# Patient Record
Sex: FEMALE | Race: AMERICAN INDIAN OR ALASKA NATIVE
[De-identification: names, ages, dates, MRNs, and addresses within clinical notes are randomized per-mention and may not be internally consistent; named-entity substitution may affect disease eponyms.]

---

## 2020-01-06 ENCOUNTER — HOSPITAL ENCOUNTER (OUTPATIENT)
Dept: HOSPITAL 5 - MAMMO | Age: 50
Discharge: HOME | End: 2020-01-06
Attending: INTERNAL MEDICINE
Payer: COMMERCIAL

## 2020-01-06 DIAGNOSIS — N63.21: ICD-10-CM

## 2020-01-06 DIAGNOSIS — Z12.31: Primary | ICD-10-CM

## 2020-01-06 PROCEDURE — 77067 SCR MAMMO BI INCL CAD: CPT

## 2020-01-06 NOTE — MAMMOGRAPHY REPORT
DIGITAL SCREENING MAMMOGRAM WITH CAD, 1/6/2020



INDICATION: Routine screening mammography. 



TECHNIQUE:  Digital bilateral  2D mammography was obtained in the craniocaudal and mediolateral obliq
ue projections. This examination was interpreted with the benefit of Computer-Aided Detection analysi
s.



COMPARISON: 7/1/2015



FINDINGS: 



Breast Density: The breasts are heterogeneously dense, which may obscure small masses.



Left upper outer circumscribed masses are new and require additional imaging. The largest measures 3 
cm on the MLO view. No architectural distortion or suspicious calcifications of the left breast. Ther
e is no evidence of dominant mass, suspicious calcifications or architectural distortion in the right
 breast.



IMPRESSION: Left masses requiring additional imaging. Recommend recall for left spot compression view
s and targeted left breast ultrasound.



Follow up recommendation: Special View: Spot



Category 0: Incomplete. Needs additional imaging evaluation and/or prior mammograms for comparison.



A "normal" or negative report should not discourage follow up or biopsy of a clinically significant f
inding.



A written summary of these findings will be mailed to the patient. The patient will be entered into a
 mammography reporting system which will generate a reminder letter for the patient's next appointmen
t at the appropriate interval.



The [employer] recommends yearly mammograms starting at age 40 and continuing as l
dianne as a woman is in good health.  Breast MRI is recommended for women with an approximate 20-25% or 
greater lifetime risk of breast cancer, including women with a strong family history of breast or ova
abhishek cancer or who have been treated for Hodgkin's disease.



Signer Name: Mario Olson MD 

Signed: 1/6/2020 2:43 PM

 Workstation Name: [id number]

## 2020-01-22 ENCOUNTER — HOSPITAL ENCOUNTER (OUTPATIENT)
Dept: HOSPITAL 5 - MAMMO | Age: 50
Discharge: HOME | End: 2020-01-22
Attending: INTERNAL MEDICINE
Payer: COMMERCIAL

## 2020-01-22 DIAGNOSIS — N60.02: Primary | ICD-10-CM

## 2020-01-22 NOTE — MAMMOGRAPHY REPORT
LEFT DIGITAL DIAGNOSTIC MAMMOGRAM WITH CAD 1/22/2020

LEFT COMPLETE BREAST ULTRASOUND

 

INDICATION: Recalled for evaluation of circumscribed masses. ABN MAMMO



TECHNIQUE:  Digital left mammographic imaging was performed. Spot compression views were obtained. Co
mplete ultrasound of all four (4) quadrants was performed. This examination was interpreted with the 
benefit of Computer-Aided Detection (CAD) analysis. 



COMPARISON: 1/6/2020



FINDINGS: 



Breast Density: The breast is heterogeneously dense, which may obscure small masses.



MAMMOGRAPHIC FINDINGS: Circumscribed masses persist in the outer breast on spot compression views.



ULTRASOUND FINDINGS: Complete sonographic evaluation of all 4 quadrants and retroareolar region was p
erformed.   Ultrasound of the left breast demonstrated multiple benign cysts. The largest is at 2:00 
7 cm from the nipple measuring 2.3 x 2.7 x 1.3 cm. It correlates with the largest mammographic mass. 
A benign cyst at 2:00 9 cm from the nipple measures 0.7 x 0.6 x 1.1 cm. A benign cyst at 1:00 6 cm fr
om the nipple measures 1.6 x 1.0 x 1.0 cm. No solid mass or shadowing.



IMPRESSION: Benign cysts left breast.



Follow up recommendation: Routine yearly



BI-RADS Category 2:  Benign.





A "normal" or negative report should not discourage follow up or biopsy of a clinically significant f
inding.



A written summary of these findings will be mailed to the patient. The patient will be entered into a
 mammography reporting system which will generate a reminder letter for the patient's next appointmen
t at the appropriate interval.



According to the American [employer] of [employer], yearly mammograms are recommended starting at age 40 
and continuing as long as a woman is in good health.  Breast MRI is recommended for women with an emely
roximately 20-25% or greater lifetime risk of breast cancer, including women with a strong family his
tory of breast or ovarian cancer and women who have been treated for Hodgkin's disease.



Signer Name: Mario Olson MD 

Signed: 1/22/2020 2:33 PM

 Workstation Name: [id number]

## 2020-01-22 NOTE — ULTRASOUND REPORT
LEFT DIGITAL DIAGNOSTIC MAMMOGRAM WITH CAD 1/22/2020

LEFT COMPLETE BREAST ULTRASOUND

 

INDICATION: Recalled for evaluation of circumscribed masses. ABN MAMMO



TECHNIQUE:  Digital left mammographic imaging was performed. Spot compression views were obtained. Co
mplete ultrasound of all four (4) quadrants was performed. This examination was interpreted with the 
benefit of Computer-Aided Detection (CAD) analysis. 



COMPARISON: 1/6/2020



FINDINGS: 



Breast Density: The breast is heterogeneously dense, which may obscure small masses.



MAMMOGRAPHIC FINDINGS: Circumscribed masses persist in the outer breast on spot compression views.



ULTRASOUND FINDINGS: Complete sonographic evaluation of all 4 quadrants and retroareolar region was p
erformed.   Ultrasound of the left breast demonstrated multiple benign cysts. The largest is at 2:00 
7 cm from the nipple measuring 2.3 x 2.7 x 1.3 cm. It correlates with the largest mammographic mass. 
A benign cyst at 2:00 9 cm from the nipple measures 0.7 x 0.6 x 1.1 cm. A benign cyst at 1:00 6 cm fr
om the nipple measures 1.6 x 1.0 x 1.0 cm. No solid mass or shadowing.



IMPRESSION: Benign cysts left breast.



Follow up recommendation: Routine yearly



BI-RADS Category 2:  Benign.





A "normal" or negative report should not discourage follow up or biopsy of a clinically significant f
inding.



A written summary of these findings will be mailed to the patient. The patient will be entered into a
 mammography reporting system which will generate a reminder letter for the patient's next appointmen
t at the appropriate interval.



According to the American [employer], yearly mammograms are recommended starting at age 40 
and continuing as long as a woman is in good health.  Breast MRI is recommended for women with an emely
roximately 20-25% or greater lifetime risk of breast cancer, including women with a strong family his
tory of breast or ovarian cancer and women who have been treated for Hodgkin's disease.



Signer Name: Mario Olson MD 

Signed: 1/22/2020 2:33 PM

 Workstation Name: [id number]

## 2021-02-02 ENCOUNTER — HOSPITAL ENCOUNTER (OUTPATIENT)
Dept: HOSPITAL 5 - CT | Age: 51
Discharge: HOME | End: 2021-02-02
Attending: OBSTETRICS & GYNECOLOGY
Payer: COMMERCIAL

## 2021-02-02 DIAGNOSIS — R19.07: ICD-10-CM

## 2021-02-02 DIAGNOSIS — K57.30: ICD-10-CM

## 2021-02-02 DIAGNOSIS — D25.9: Primary | ICD-10-CM

## 2021-02-02 LAB — BUN SERPL-MCNC: 20 MG/DL (ref 7–17)

## 2021-02-02 PROCEDURE — 36415 COLL VENOUS BLD VENIPUNCTURE: CPT

## 2021-02-02 PROCEDURE — 84520 ASSAY OF UREA NITROGEN: CPT

## 2021-02-02 PROCEDURE — 72193 CT PELVIS W/DYE: CPT

## 2021-02-02 PROCEDURE — 82565 ASSAY OF CREATININE: CPT

## 2021-02-02 NOTE — CAT SCAN REPORT
CT PELVIS WITH CONTRAST



HISTORY: Pelvic mass



TECHNIQUE: Helical CT was performed following 100 cc of Omnipaque 300 intravenously. Sagittal and cor
onal reformatted images. All CT scans at this location are performed using CT dose reduction for ALATALYA
A by means of automated exposure control.



COMPARISON: None



FINDINGS: The uterus is enlarged and lobulated with numerous fibroids. The uterus measures 12.9 x 9.7
 x 10.0 cm. The largest fibroid in the posterior wall measures 7.7 cm. There are approximately 12 add
itional fibroids measuring between 1 cm and 4 cm in diameter. Most of the fibroids mildly enhance. No
 calcific degeneration is appreciated. The endometrial stripe is not clearly identified secondary to 
the fibroid disease but does not appear to be thickened. The adnexa and bladder are unremarkable.



There is moderate diverticulosis of the visualized colon. Normal appendix. The bladder is empty but u
nremarkable.



No evidence for adenopathy, free fluid, free air or inflammatory changes. The vascular structures and
 bony structures are unremarkable.



IMPRESSION:

Moderate to severe uterine fibroid disease as described above.



Signer Name: Keny Evans Jr, MD 

Signed: 2/2/2021 10:09 AM

Workstation Name: [id number]

## 2021-02-23 ENCOUNTER — HOSPITAL ENCOUNTER (OUTPATIENT)
Dept: HOSPITAL 5 - SPVWC | Age: 51
Discharge: HOME | End: 2021-02-23
Attending: SURGERY
Payer: COMMERCIAL

## 2021-02-23 DIAGNOSIS — Z12.31: Primary | ICD-10-CM

## 2021-02-23 PROCEDURE — 77067 SCR MAMMO BI INCL CAD: CPT

## 2021-02-23 NOTE — MAMMOGRAPHY REPORT
DIGITAL SCREENING MAMMOGRAM WITH CAD, 2/23/2021



CLINICAL INFORMATION / INDICATION: Routine screening mammography. SCREENING MAMMO



TECHNIQUE:  Digital bilateral 2D mammography was obtained in the craniocaudal and mediolateral obliqu
e projections. This examination was interpreted with the benefit of Computer-Aided Detection analysis
.



COMPARISON: 1/6/2020



FINDINGS: 



Breast Density: The breasts are heterogeneously dense, which may obscure small masses.



No dominant mass, suspicious calcifications, or architectural distortion in either breast. 



Extensive nodularity most notably in the left breast again noted with a scar also in the left breast.
 Findings appear largely similar to the previous exam.

 

IMPRESSION: No mammographic evidence of malignancy.



Follow up recommendation: Routine yearly



BI-RADS Category 2:  Benign.





-------------------------------------------------------------------------------------------

A "normal" or negative report should not discourage follow up or biopsy of a clinically significant f
inding.



A written summary of these findings will be mailed to the patient. The patient will be entered into a
 mammography reporting system which will generate a reminder letter for the patient's next appointmen
t at the appropriate interval.



The [employer] recommends yearly mammograms starting at age 40 and continuing as l
dianne as a woman is in good health.  Breast MRI is recommended for women with an approximate 20-25% or 
greater lifetime risk of breast cancer, including women with a strong family history of breast or ova
abhishek cancer or who have been treated for Hodgkin's disease.



Signer Name: Moises Ruelas MD 

Signed: 2/23/2021 10:43 AM

Workstation Name: [id number]

## 2021-03-11 ENCOUNTER — HOSPITAL ENCOUNTER (OUTPATIENT)
Dept: HOSPITAL 5 - US | Age: 51
Discharge: HOME | End: 2021-03-11
Attending: SURGERY
Payer: COMMERCIAL

## 2021-03-11 DIAGNOSIS — N63.20: ICD-10-CM

## 2021-03-11 DIAGNOSIS — N60.02: Primary | ICD-10-CM

## 2021-03-11 PROCEDURE — 76942 ECHO GUIDE FOR BIOPSY: CPT

## 2021-03-11 NOTE — ULTRASOUND REPORT
Ultrasound-guided left breast cyst aspiration



HISTORY: LUMP IN LEFT BREAST.



COMPARISON:  Mammogram from 2/23/2021 and previous ultrasound from 1/22/2020



PROCEDURE:  The risks (including but not limited to bleeding and infection) and benefits were explain
ed to the patient and informed consent was obtained.  A time out procedure was performed.  The proced
ure site was prepped and draped in the usual sterile fashion and lidocaine was used for local anesthe
humberto.  



Under direct ultrasound guidance, a 3.2 cm cyst along the outer left breast was targeted for aspirati
on. A 16-gauge needle was advanced into the cyst and the cyst was completely collapsed. I aspirated a
 total of 8 mL of thin slightly cloudy yellow tinted fluid. Fluid was placed in a sterile cup and sen
t to the lab for cytology evaluation.



The patient tolerated the procedure well with no complications.



IMPRESSION: Successful complete aspiration of the 3.2 cm left breast cyst.



 



Signer Name: Moises Ruelas MD 

Signed: 3/11/2021 10:08 AM

Workstation Name: [id number]

## 2021-04-01 LAB
BAND NEUTROPHILS # (MANUAL): 0 K/MM3
BUN SERPL-MCNC: 9 MG/DL (ref 7–17)
BUN/CREAT SERPL: 10 %
CALCIUM SERPL-MCNC: 8.9 MG/DL (ref 8.4–10.2)
HCT VFR BLD CALC: 33.9 % (ref 30.3–42.9)
HEMOLYSIS INDEX: 4
HGB BLD-MCNC: 11.3 GM/DL (ref 10.1–14.3)
MCHC RBC AUTO-ENTMCNC: 33 % (ref 30–34)
MCV RBC AUTO: 88 FL (ref 79–97)
MYELOCYTES # (MANUAL): 0 K/MM3
PLATELET # BLD: 429 K/MM3 (ref 140–440)
PROMYELOCYTES # (MANUAL): 0 K/MM3
RBC # BLD AUTO: 3.87 M/MM3 (ref 3.65–5.03)
TOTAL CELLS COUNTED BLD: 100

## 2021-04-01 NOTE — HISTORY AND PHYSICAL REPORT
History of Present Illness


Date of examination: 21


Chief complaint: 





This is a 50 year-old female with menorrhagia, pelvic pain and uterine fibroids 

who presents for definitive therapy in the form of hysterectomy and any other 

indicated procedures. 


History of present illness: 


Past Pregnancy History 


   :      5


   Term Births:   4


   Living Children:   4


   Aborta:      1


   Elect. Ab:      1





Pregnancy # 1


   Delivery date:     1989


   Weeks Gestation:   40


   Delivery type:     


   Infant Sex:      Male


   Birth weight:   7'12


   Comments:      FD





Pregnancy # 2


   Delivery date:     1991


   Weeks Gestation:   40


   Delivery type:     


   Infant Sex:      Male


   Birth weight:   7'15


   Comments:      glucose issues





Pregnancy # 3


   Delivery date:     12/15/1993


   Weeks Gestation:   42


   Delivery type:     


   Infant Sex:      Male


   Birth weight:   6'14


   Comments:      glucose issues





Pregnancy # 4


   Delivery date:     1998


   Weeks Gestation:   40


   Delivery type:     


   Infant Sex:      Female


   Birth weight:   8'15


   Comments:      glucose issues








GYN History 


Operations: 


umbilical hernia repair


cyst removed from the mons. not sure why. incision was left open ()


colonoscopy . for IBS and diverticulosis() diverticulitis


Tubal Ligation ()





Abnormal PAP: positive





Infection History 


HIV Risk Eval: no


Personal hx. of genital herpes: yes


Hx of STD: HSV





Active Medications (reviewed today):


VITAMIN D3 125 MCG (5000 UT) ORAL CAPSULE (CHOLECALCIFEROL) 


METRONIDAZOLE 500 MG ORAL TABLET (METRONIDAZOLE) 1 po bid


AMOXICILLIN 500 MG ORAL CAPSULE (AMOXICILLIN) 1 po q 8hrs


MIRALAX 17 GM ORAL PACKET (POLYETHYLENE GLYCOL 3350) 


PROTONIX 40 MG ORAL TABLET DELAYED RELEASE (PANTOPRAZOLE SODIUM) 


ACETAMINOPHEN-CODEINE TABLET (ACETAMINOPHEN-CODEINE TABS) 


AMLODIPINE BESYLATE TABLET (AMLODIPINE BESYLATE TABS) 





Current Allergies (reviewed today):


LATEX GLOVES (DISPOSABLE GLOVES) (Critical)








Past Medical History:


   Herniated lumbar disc receives epidurals twice year


   diverticulitis/divericulosis


   hx of hsv oral.


   IBS


   Diabetes diet controlled


   Hyperlipidemia


   Hypertension


   Vitamin D deficiency





Past Surgical History:


   Reviewed history from 2021 and no changes required:


      


      umbilical hernia repair


      cyst removed from the mons. not sure why. incision was left open ()


      colonoscopy . for IBS and diverticulosis() 

diverticulitis(3/28/2021)


      Tubal Ligation ()





Family History Summary: 


   Reviewed history Last on 2020 and no changes required:2021


Other Family Member - Has No Family History of Uterine Cancer - Entered On: 

2021


Other Family Member - Has No Family History of Small Bowel Cancer - Entered On: 

2021


Other Family Member - Has No Family History of Stomach Cancer - Entered On: 

2021


Other Family Member - Has No Family History of Pancreatic Cancer - Entered On: 

2021


Other Family Member - Has No Family History of Ovarvian Cancer - Entered On: 

2021


Other Family Member - Has No Family History of Kidney/Urinary Tract Cancer - 

Entered On: 2021


Other Family Member - Has No Family History of Spontaneous DVT-PE - Entered On: 

2021


Other Family Member - Has No Family History of Colon Cancer - Entered On: 

2021


Other Family Member - Has No Family History of Brain Cancer - Entered On: 

2021


Other Family Member - Has No Family History of Breast Cancer - Entered On: 

2021


Other Family Member - Has No Family History of Biliary Tract Cancer - Entered 

On: 2021





General Comments - FH:


mother Hodgkins 


Family History of Diabetes


mother and father, t2dm


Family History of Hyperlipidemia


Family History of Coronary Heart Disease


sister  age  51 mi








son has epilepsy. multiple seizures age 19. 





Social History:


   Reviewed history from 2020 and no changes required:


      Patient is 


      


      Smoking History:


      Patient has never smoked.








Risk Factors: 





Smoked Tobacco Use:  Never smoker


Smokeless Tobacco Use:  Never


Drug use:  no


HIV high-risk behavior:  no


Alcohol use:  yes


Exercise:  no


Seatbelt use:  100 %





Previous Tobacco Use: Signed On - 2021


Smoked Tobacco Use:  Never smoker


Smokeless Tobacco Use:  Never


Drug use:  no


HIV high-risk behavior:  no





Previous Alcohol Use: Signed On 2021


Alcohol use:  yes


   Type:  occ


   Drinks per day:  social


Exercise:  no


Seatbelt use:  100 %





Family History Risk Factors:


   Family History of MI in females < 65 years old:  yes





Colonoscopy History:


   Date of Last Colonoscopy:  2020





Mammogram History:


   Date of Last Mammogram:  2020





PAP Smear History:


   Date of Last PAP Smear:  2020














Physical Exam 


Appearance: well developed, well nourished, no acute distress





Other Exams 


Lungs: no rales, rhonchi, or wheezes


Heart: S1, S2, no murmur, rub, or gallop


Abdomen: soft, non-tender, no masses





Genitourinary Exam 


Uterus: deferred for EUA











Impression & Recommendations:





Problem # 1:  Menorrhagia (ICD-626.2) (AYA87-K04.0)


Diagnosis explained to patient . Questions answered. Discussed with patient 

various medical and surgical therapies common for treatment: Hormonal/medical 

therapy or hysterectomy.  She desire to proceed with hysterectomy


Diagnosis explained to patient . Discussed with patient various medical, 

surgical and radiological therapies common for treatment including, but not 

limited to, myomectomy,  hysterectomy and uterine artery embolization.  

Discussed risks and benefits of laparotomy, laparoscopy, vaginal and robotic 

assisted approaches for hysterectomies. Patient desires definitive treatment in 

the form of robot assisted laparoscopic total hysterectomy.    The risks and 

alternatives for this surgery were reviewed with the patient.  She was informed 

of  the risks of the surgery including, but not limited to, pain, infection, 

bleeding possibly heavy enough to require a blood transfusion with associated 

risks of infections (hepatitis and HIV) and transfusion reactions, possible 

damage to bowel, bladder or ureter(s) and surrounding organs. . Patient 

understands that this surgery will  make her sterile. Indications to abort a 

robotic/laparoscopic procedure and perform an open procedure were explained. She

desires ovarian conservation. She was informed she may require surgery later to 

have her ovaries removed for a benign or mailgnant condition. Patient 

understands if her ovaries are removed she will become menopausal. Patient 

advised the small risks of spreading of malignancy if morcellation  is required 

during the surgery patient understands and approves performing if necessary. 

Questions answered. Consent reviewed and signed


The patient was instructed/informed the following:


   The normal length of hospital stay for this procedure.


   Nothing to eat or drink after midnight the evening prior to surgery. 


   Clear liquids the day before surgery.  


   Pre-op instruction sheets given. 


Wound care instructions given.





Problem # 2:  Fibroids of  uterus; Intramural (ICD-218.1) (QTY34-V61.1)


Diagnosis explained to patient . Questions answered. Discussed with patient paige

ious medical, surgical and radioloigal therapies common for treatment: 

Hormonal/medical therapy, fibroid embolization, removal of fibroids or 

hysterectomy 


Her updated medication list for this problem includes:


   Metronidazole 500 Mg Oral Tablet (Metronidazole) ..... 1 po bid


   Ibgard 90 Mg Oral Capsule Extended Release (Peppermint oil)


   Amoxicillin 500 Mg Oral Capsule (Amoxicillin) ..... 1 po q 8hrs


   Acetaminophen-codeine Tablet (Acetaminophen-codeine tabs








Problem # 3:  Pelvic and perineal pain (ICD-789.00) (BJW96-L05.2)


It was extensively explained to her that her pain may persist, recur or change 

in nature due to the difficulty with diagnosis chronic pelvic pain or 

development of adhesions. She declined other treatment options at this time. 


Her updated medication list for this problem includes:


   Metronidazole 500 Mg Oral Tablet (Metronidazole) ..... 1 po bid


   Amoxicillin 500 Mg Oral Capsule (Amoxicillin) ..... 1 po q 8hrs


   Acetaminophen-codeine Tablet (Acetaminophen-codeine tabs)














Medications Added to Medication List This Visit:


1)  Vitamin D3 125 Mcg (5000 Ut) Oral Capsule (Cholecalciferol)


2)  Metronidazole 500 Mg Oral Tablet (Metronidazole) .... 1 po bid


3)  Vitamin D (cholecalciferol) 25 Mcg (1000 Ut) Oral Capsule (Cholecalciferol)


4)  Ibgard 90 Mg Oral Capsule Extended Release (Peppermint oil)


5)  Align Capsule (Probiotic product caps)


6)  Amoxicillin 500 Mg Oral Capsule (Amoxicillin) .... 1 po q 8hrs


7)  Pravastatin Sodium Tablet (Pravastatin sodium tabs)














]














Medications and Allergies


                                    Allergies











Allergy/AdvReac Type Severity Reaction Status Date / Time


 


latex Allergy  Rash, Verified 21 16:16





   irritation  











Active Meds: 


Active Medications





Acetaminophen (Acetaminophen 500 Mg Tab)  1,000 mg PO ONCE NR


   Stop: 21 20:00


Celecoxib (Celecoxib 200 Mg Cap)  400 mg PO PREOP NR


   Stop: 21 20:00


Fentanyl (Fentanyl 100 Mcg/2 Ml Inj)  100 mcg IV ONCE NR


   Stop: 21 20:00


Gabapentin (Gabapentin 300 Mg Cap)  600 mg PO PREOP NR


   Stop: 21 23:00


Lactated Ringer's (Lactated Ringers)  1,000 mls @ 125 mls/hr IV AS DIRECT CHAPITO


Cefazolin Sodium (Ancef/Sterile Water 2 Gm/20 Ml)  2 gm in 20 mls @ 80 mls/hr IV

PREOP NR; Protocol


   Stop: 21 20:00


Magnesium Oxide (Magnesium Oxide 400 Mg Tab)  400 mg PO ONCE NR


   Stop: 21 22:00


Midazolam HCl (Midazolam 2 Mg/2 Ml Inj)  2 mg IV PREOP NR


   Stop: 21 23:59











Assessment and Plan





- Patient Problems


(1) Menorrhagia


Status: Acute   





(2) Fibroids


Status: Acute   





(3) Pelvic pain


Status: Acute   





(4) Hypertension


Status: Chronic   





(5) Hyperlipidemia


Status: Chronic   





(6) Vitamin D deficiency


Status: Chronic   





(7) Diverticulosis


Status: Chronic   





(8) Diabetes


Status: Chronic   





(9) Lumbar herniated disc


Status: Chronic

## 2021-04-01 NOTE — ANESTHESIA CONSULTATION
Anesthesia Consult and Med Hx


Date of service: 04/06/21





- Airway


Anesthetic Teeth Evaluation: Caps


ROM Head & Neck: Adequate


Mental/Hyoid Distance: Adequate


Mallampati Class: Class II


Intubation Access Assessment: Good





- Pre-Operative Health Status


ASA Pre-Surgery Classification: ASA3


Proposed Anesthetic Plan: General


Nerve Block: TAP





- Pulmonary


Hx Smoking: Yes (Former)


Hx Respiratory Symptoms: No (+2FS)





- Cardiovascular System


Hx Hypertension: Yes (+Cardiac clearance)





- Central Nervous System


Hx Back Pain: Yes (HNP-gets epidurals)


Hx Psychiatric Problems: No





- Gastrointestinal


Hx Gastroesophageal Reflux Disease: Yes (IBS/diverticulosis; Was just 

hospitalized for diverticulitis)





- Endocrine


Hx Non-Insulin Dependent Diabetes: Yes (Diet-controlled)





- Hematic


Hx Sickle Cell Disease: No





- Other Systems


Hx Alcohol Use: Yes (Occas)


Hx Cancer: No


Hx Obesity: Yes





- Additional Comments


Anesthesia Medical History Comments: States she had awareness during BTL surgery

## 2021-04-06 ENCOUNTER — HOSPITAL ENCOUNTER (OUTPATIENT)
Dept: HOSPITAL 5 - OR | Age: 51
Setting detail: OBSERVATION
LOS: 1 days | Discharge: HOME | End: 2021-04-07
Attending: OBSTETRICS & GYNECOLOGY | Admitting: OBSTETRICS & GYNECOLOGY
Payer: COMMERCIAL

## 2021-04-06 DIAGNOSIS — E55.9: ICD-10-CM

## 2021-04-06 DIAGNOSIS — E11.9: ICD-10-CM

## 2021-04-06 DIAGNOSIS — E78.5: ICD-10-CM

## 2021-04-06 DIAGNOSIS — M51.26: ICD-10-CM

## 2021-04-06 DIAGNOSIS — Z98.51: ICD-10-CM

## 2021-04-06 DIAGNOSIS — N92.0: Primary | ICD-10-CM

## 2021-04-06 DIAGNOSIS — I10: ICD-10-CM

## 2021-04-06 DIAGNOSIS — D25.9: ICD-10-CM

## 2021-04-06 DIAGNOSIS — Z98.891: ICD-10-CM

## 2021-04-06 DIAGNOSIS — R10.9: ICD-10-CM

## 2021-04-06 DIAGNOSIS — K57.90: ICD-10-CM

## 2021-04-06 DIAGNOSIS — Z98.890: ICD-10-CM

## 2021-04-06 DIAGNOSIS — Z20.822: ICD-10-CM

## 2021-04-06 PROCEDURE — 36415 COLL VENOUS BLD VENIPUNCTURE: CPT

## 2021-04-06 PROCEDURE — 82962 GLUCOSE BLOOD TEST: CPT

## 2021-04-06 PROCEDURE — 88307 TISSUE EXAM BY PATHOLOGIST: CPT

## 2021-04-06 PROCEDURE — 64450 NJX AA&/STRD OTHER PN/BRANCH: CPT

## 2021-04-06 PROCEDURE — 86850 RBC ANTIBODY SCREEN: CPT

## 2021-04-06 PROCEDURE — 96366 THER/PROPH/DIAG IV INF ADDON: CPT

## 2021-04-06 PROCEDURE — 86900 BLOOD TYPING SEROLOGIC ABO: CPT

## 2021-04-06 PROCEDURE — 58573 TLH W/T/O UTERUS OVER 250 G: CPT

## 2021-04-06 PROCEDURE — 80048 BASIC METABOLIC PNL TOTAL CA: CPT

## 2021-04-06 PROCEDURE — 83036 HEMOGLOBIN GLYCOSYLATED A1C: CPT

## 2021-04-06 PROCEDURE — 85025 COMPLETE CBC W/AUTO DIFF WBC: CPT

## 2021-04-06 PROCEDURE — 96365 THER/PROPH/DIAG IV INF INIT: CPT

## 2021-04-06 PROCEDURE — 86901 BLOOD TYPING SEROLOGIC RH(D): CPT

## 2021-04-06 PROCEDURE — 96372 THER/PROPH/DIAG INJ SC/IM: CPT

## 2021-04-06 PROCEDURE — 85007 BL SMEAR W/DIFF WBC COUNT: CPT

## 2021-04-06 PROCEDURE — 81025 URINE PREGNANCY TEST: CPT

## 2021-04-06 PROCEDURE — 88302 TISSUE EXAM BY PATHOLOGIST: CPT

## 2021-04-06 PROCEDURE — 85014 HEMATOCRIT: CPT

## 2021-04-06 PROCEDURE — 96367 TX/PROPH/DG ADDL SEQ IV INF: CPT

## 2021-04-06 PROCEDURE — 96376 TX/PRO/DX INJ SAME DRUG ADON: CPT

## 2021-04-06 PROCEDURE — U0003 INFECTIOUS AGENT DETECTION BY NUCLEIC ACID (DNA OR RNA); SEVERE ACUTE RESPIRATORY SYNDROME CORONAVIRUS 2 (SARS-COV-2) (CORONAVIRUS DISEASE [COVID-19]), AMPLIFIED PROBE TECHNIQUE, MAKING USE OF HIGH THROUGHPUT TECHNOLOGIES AS DESCRIBED BY CMS-2020-01-R: HCPCS

## 2021-04-06 PROCEDURE — G0378 HOSPITAL OBSERVATION PER HR: HCPCS

## 2021-04-06 PROCEDURE — 96375 TX/PRO/DX INJ NEW DRUG ADDON: CPT

## 2021-04-06 PROCEDURE — A4217 STERILE WATER/SALINE, 500 ML: HCPCS

## 2021-04-06 PROCEDURE — 85018 HEMOGLOBIN: CPT

## 2021-04-06 RX ADMIN — HYDROMORPHONE HYDROCHLORIDE PRN MG: 1 INJECTION, SOLUTION INTRAMUSCULAR; INTRAVENOUS; SUBCUTANEOUS at 12:05

## 2021-04-06 RX ADMIN — HYDROMORPHONE HYDROCHLORIDE PRN MG: 1 INJECTION, SOLUTION INTRAMUSCULAR; INTRAVENOUS; SUBCUTANEOUS at 11:55

## 2021-04-06 RX ADMIN — MIDAZOLAM NR MG: 1 INJECTION INTRAMUSCULAR; INTRAVENOUS at 07:19

## 2021-04-06 RX ADMIN — KETOROLAC TROMETHAMINE SCH MG: 30 INJECTION, SOLUTION INTRAMUSCULAR at 15:53

## 2021-04-06 RX ADMIN — ACETAMINOPHEN SCH MG: 325 TABLET ORAL at 21:57

## 2021-04-06 RX ADMIN — FAMOTIDINE SCH MG: 10 INJECTION, SOLUTION INTRAVENOUS at 21:56

## 2021-04-06 RX ADMIN — KETOROLAC TROMETHAMINE SCH MG: 30 INJECTION, SOLUTION INTRAMUSCULAR at 21:56

## 2021-04-06 RX ADMIN — METRONIDAZOLE SCH MLS/HR: 5 INJECTION, SOLUTION INTRAVENOUS at 22:51

## 2021-04-06 RX ADMIN — HYDROMORPHONE HYDROCHLORIDE PRN MG: 1 INJECTION, SOLUTION INTRAMUSCULAR; INTRAVENOUS; SUBCUTANEOUS at 10:40

## 2021-04-06 RX ADMIN — MIDAZOLAM NR MG: 1 INJECTION INTRAMUSCULAR; INTRAVENOUS at 07:14

## 2021-04-06 RX ADMIN — SODIUM CHLORIDE, SODIUM LACTATE, POTASSIUM CHLORIDE, AND CALCIUM CHLORIDE SCH MLS/HR: .6; .31; .03; .02 INJECTION, SOLUTION INTRAVENOUS at 06:50

## 2021-04-06 RX ADMIN — CEFAZOLIN SCH MLS/HR: 330 INJECTION, POWDER, FOR SOLUTION INTRAMUSCULAR; INTRAVENOUS at 21:54

## 2021-04-06 RX ADMIN — INSULIN HUMAN SCH UNITS: 100 INJECTION, SOLUTION PARENTERAL at 17:30

## 2021-04-06 RX ADMIN — CEFAZOLIN SCH MLS/HR: 330 INJECTION, POWDER, FOR SOLUTION INTRAMUSCULAR; INTRAVENOUS at 15:00

## 2021-04-06 RX ADMIN — HYDROMORPHONE HYDROCHLORIDE PRN MG: 1 INJECTION, SOLUTION INTRAMUSCULAR; INTRAVENOUS; SUBCUTANEOUS at 10:52

## 2021-04-06 RX ADMIN — ACETAMINOPHEN SCH MG: 325 TABLET ORAL at 14:48

## 2021-04-06 RX ADMIN — INSULIN HUMAN SCH UNITS: 100 INJECTION, SOLUTION PARENTERAL at 22:07

## 2021-04-06 RX ADMIN — SODIUM CHLORIDE, SODIUM LACTATE, POTASSIUM CHLORIDE, AND CALCIUM CHLORIDE SCH MLS/HR: .6; .31; .03; .02 INJECTION, SOLUTION INTRAVENOUS at 13:20

## 2021-04-06 NOTE — POST ANESTHESIA EVALUATION
- Post Anesthesia Evaluation


Patient Participated: Yes


Airway Patent: Yes


Stable Respiratory Function: Yes


Nausea/Vomiting: No


Temp > 96.8F: Yes


Pain Manageable: Yes (Pt was having preoperative diverticul. pain)


Adequeate Hydration: Yes


Anesthesia Complications: No


Block Receding Appropriately: Yes


Patient on Ventilator: No


Other Comments: Loose tooth intact

## 2021-04-06 NOTE — OPERATIVE REPORT
Operative Report


Operative Report: 


Date:      4/6/2021





Preoperative diagnosis:   1.  Menorrhagia


         2.  Uterine fibroid


         3.  Body mass index of 38.7 kg/m


         4.  Pelvic pain


         


         


Postoperative diagnosis:   1.  Menorrhagia


         2.  Uterine fibroid


         3.  Body mass index of 38.7 kg/m


         4.  Pelvic pain





Procedure:      1.  Robotic-assisted laparoscopic total hysterectomy with 

bilateral 


         salpingectomy


         


Surgeon:      Germania Yi MD





Assistant:         Elizabeth Espinosa





Anesthesiologist:   Dr. Bhatt





Anesthesia:      General endotracheal anesthesia





EBL:       Approximately 100 mL 





Findings:       EUA: Uterus palpated to approximately 15 weeks.    Uterus was 

sounded to 12 cm.  Grossly normal tubes and ovaries.  Left ovary was adhered to 

the posterior aspect of the uterus.





Procedure:      Patient was taken to the OR and placed in the supine position.  

General anesthesia was induced and an oral gastric tube was placed.  Her neck 

and head were placed on foam support.  Foam eye protection with goggles were 

secured in place.  Then foam face protection was placed and secured.  Foam 

shoulder pads were then positioned on her shoulders for Trendelenburg positi

oning.  She was then placed in dorsolithotomy position.  Exam under anesthesia 

as above.  The abdomen and vagina were then prepped and draped in the usual 

sterile fashion.  Timeout was performed.  A Christianson catheter was inserted into the

bladder with drainage of clear yellow urine.  The operative speculum was 

introduced into the vagina and the anterior lip of the cervix was grasped with 

single-toothed tenaculum.  The uterus was sounded to 12 cm.  The cervix was 

progressively dilated to allow the large V care uterine manipulator.  The bulb 

of the manipulator was inflated and the speculum and tenaculum were removed.  

The cup of the manipulator was placed around the cervix and the blue occluder of

the manipulator was properly positioned in the vagina and secured.  A laparotomy

sponge that was saturated with a solution of polymyxin and saline was placed in 

the vagina to ensure pneumoperitoneum.  Sterile gloves were placed and attention

was turned to the abdomen.





A 10 mm midline vertical supraumbilical incision was made approximately 10 cm 

superior to the elevated fundus of the uterus.  A 10-12 mm trocar with the 

laparoscope and camera attached was introduced through this incision under 

direct visualization.  The abdomen was insufflated.  No obvious bowel, bladder, 

ureteral, or major vascular injury was noted.  The patient was then placed in 

steep Trendelenburg position and the following trochars were placed under direct

visualization: 8 mm robotic trochars were placed through incisions made in the 

bilateral midclavicular lower abdominal region approximately 10 cm lateral to 

the midline incision, and a 5 mm trocar was placed through an incision made in 

the right lower lateral pelvis.  The 10 mm laparoscope was then replaced by a 5 

mm laparoscope that was placed through the 5 millimeter lateral trocar.  The 12 

mm trocar was then removed and the Sohail Perez fascial closure device was 

placed through the incision and a 0 Vicryl was placed through the fascia.  Once 

the suture was secured the 12 mm trocar was reintroduced.  Once the trochars 

were in the appropriate positions,  the  da Abby robot system was engaged.  The

EndoShears and bipolar device was placed through the 8 mm trochars and 

positioned then attention was turned to the console.  The uterus was elevated 

and bilateral salpingectomy was performed.  Each tube was removed through the 5 

mm trocar and sent to pathology in separate containers.  The left ovary was 

gently released from the anterior fundal lateral aspect of the uterus.  Then the

utero-ovarian ligaments were clamped. cauterized and incised bilaterally using 

30 W of energy.  Then the round ligaments were clamped, cauterized and incised 

bilaterally.    The anterior leaf of the broad ligament was elevated and with 

careful blunt and sharp dissection the bladder flap was created and dissected 

away from the lower uterine segment and cervix.  The posterior leaf of the broad

ligament was dissected away from the uterine vessels.   The cup of the uterine 

manipulator was palpated both anteriorly and posteriorly.  The bladder was 

further dissected away from the lower uterine segment.  The uterine vessels were

then clamped and cauterized bilaterally.  Blanching of the uterus was then 

noted.  Attention was again turned to the anterior lower uterine segment and the

bladder was confirmed to be away from the operative field.  Then attention was 

turned again to the posterior where the cup of the manipulator was palpated and 

a colpotomy was performed down to the cup.  The incision was extended in the 

lateral position to the uterine vessels that were again clamped and cauterized 

and incised.  Continuing along the cup of the manipulator in a circumferential 

manner the colpotomy was completed. 








Once the uterus cervix were released, the large posterior fibroid was removed to

assist with removing the virus from the vagina.  The uterus,cervix and fibroid 

were then removed through the vaginal incision.    The pelvis was irrigated with

warm normal saline.  A moist laparotomy sponge was placed in the vagina to 

maintain pneumoperitoneum.  The vagina cuff was reapproximated using V  

suture.  Then a J stitch was performed to secure the suture.  Again the pelvis 

was copiously irrigated with polymixin in warm normal saline.  The laparotomy 

sponge was removed from the vagina.  No obvious evidence of bowel, bladder, 

ureteral, or major vascular injury was noted. 





Rom was applied to ensure hemostasis..





Then the instruments were removed, the robot was disengaged.  The 12 mm trocar 

was removed and the fascia  was ligated with the 0 Vicryl suture that was placed

at the beginning of the procedure.  The patient was taken out of Trendelenburg 

position, the abdomen was desufflated,  the remaining trochars were removed.  

Incisions were reapproximated using 4-0 Monocryl in a subcuticular manner.    

Surgiseal was placed over the other incisions.  The vagina was then inspected, 

the cuff was palpated to be intact and no bleeding was noted and clear yellow 

urine was draining into the Christianson bag from the bladder at the end of the 

procedure.  Counts were correct 3. Patient was taken to recovery room in stable

condition.

## 2021-04-06 NOTE — PROGRESS NOTE
Assessment and Plan





Patient resting in bed, no complaints. Clear yellow urine draining into 

catheter. Operative findings and procedure explained. Post op plan of care 

discussed. Questions were encouraged and answered. She voiced understanding and 

agrees with POC





- Patient Problems


(1) History of robot-assisted laparoscopic hysterectomy


Current Visit: Yes   Status: Acute   





(2) Menorrhagia


Current Visit: No   Status: Resolved   





(3) Fibroids


Current Visit: No   Status: Resolved   





(4) Pelvic pain


Current Visit: No   Status: Resolved   





(5) Hypertension


Current Visit: No   Status: Chronic   





(6) Hyperlipidemia


Current Visit: No   Status: Chronic   





(7) Vitamin D deficiency


Current Visit: No   Status: Chronic   





(8) Diverticulosis


Current Visit: No   Status: Chronic   





(9) Diabetes


Current Visit: No   Status: Chronic   





(10) Lumbar herniated disc


Current Visit: No   Status: Chronic   





Subjective


Date of service: 04/06/21


Patient Reports: Positive: no new complaints





Objective


                               Vital Signs - 12hr











  04/06/21 04/06/21 04/06/21





  07:13 07:18 07:23


 


Temperature   


 


Pulse Rate 81 74 76


 


Respiratory 16 17 14





Rate   


 


Blood Pressure 132/78 138/81 144/82


 


O2 Sat by Pulse 100 100 100





Oximetry   














  04/06/21 04/06/21 04/06/21





  07:28 10:20 10:25


 


Temperature  97.2 F L 


 


Pulse Rate 75 86 76


 


Respiratory 16 27 H 18





Rate   


 


Blood Pressure 142/78 136/78 121/75


 


O2 Sat by Pulse 100 100 100





Oximetry   














  04/06/21 04/06/21 04/06/21





  10:30 10:35 10:40


 


Temperature   


 


Pulse Rate 80 71 


 


Respiratory 20 18 16





Rate   


 


Blood Pressure 124/74 136/74 


 


O2 Sat by Pulse 100 100 





Oximetry   














  04/06/21 04/06/21 04/06/21





  10:50 10:51 10:52


 


Temperature 98.1 F  


 


Pulse Rate 70  


 


Respiratory 16 17 22





Rate   


 


Blood Pressure 152/81  


 


O2 Sat by Pulse 100  





Oximetry   














  04/06/21 04/06/21 04/06/21





  11:12 12:05 12:25


 


Temperature   


 


Pulse Rate   


 


Respiratory 15 16 16





Rate   


 


Blood Pressure   


 


O2 Sat by Pulse   





Oximetry   














  04/06/21 04/06/21 04/06/21





  13:06 14:49 16:05


 


Temperature 97.5 F L  97.7 F


 


Pulse Rate 65 65 82


 


Respiratory 18  18





Rate   


 


Blood Pressure 141/76 141/76 123/70


 


O2 Sat by Pulse 100  99





Oximetry   














- General physical appearance


no distress





- Respiratory


normal expansion, normal respiratory effort, clear to auscultation





- Abdomen


soft, bowel sounds normal, surgical scars (C/D/I)





- Psychiatric


oriented to time, oriented to person, oriented to place, speech is normal, 

memory intact





- Labs





                                 04/01/21 13:00





                                 04/01/21 06:00

## 2021-04-07 VITALS — SYSTOLIC BLOOD PRESSURE: 115 MMHG | DIASTOLIC BLOOD PRESSURE: 57 MMHG

## 2021-04-07 LAB
BUN SERPL-MCNC: 7 MG/DL (ref 7–17)
BUN/CREAT SERPL: 9 %
CALCIUM SERPL-MCNC: 8 MG/DL (ref 8.4–10.2)
HCT VFR BLD CALC: 27.1 % (ref 30.3–42.9)
HEMOLYSIS INDEX: 0
HGB BLD-MCNC: 9 GM/DL (ref 10.1–14.3)

## 2021-04-07 RX ADMIN — INSULIN HUMAN SCH: 100 INJECTION, SOLUTION PARENTERAL at 00:55

## 2021-04-07 RX ADMIN — KETOROLAC TROMETHAMINE SCH MG: 30 INJECTION, SOLUTION INTRAMUSCULAR at 05:51

## 2021-04-07 RX ADMIN — SODIUM CHLORIDE, SODIUM LACTATE, POTASSIUM CHLORIDE, AND CALCIUM CHLORIDE SCH MLS/HR: .6; .31; .03; .02 INJECTION, SOLUTION INTRAVENOUS at 09:13

## 2021-04-07 RX ADMIN — METRONIDAZOLE SCH MLS/HR: 5 INJECTION, SOLUTION INTRAVENOUS at 10:30

## 2021-04-07 RX ADMIN — ACETAMINOPHEN SCH MG: 325 TABLET ORAL at 06:37

## 2021-04-07 RX ADMIN — SODIUM CHLORIDE, SODIUM LACTATE, POTASSIUM CHLORIDE, AND CALCIUM CHLORIDE SCH MLS/HR: .6; .31; .03; .02 INJECTION, SOLUTION INTRAVENOUS at 02:36

## 2021-04-07 RX ADMIN — FAMOTIDINE SCH MG: 10 INJECTION, SOLUTION INTRAVENOUS at 08:44

## 2021-04-07 RX ADMIN — ACETAMINOPHEN SCH: 325 TABLET ORAL at 00:55

## 2021-04-07 RX ADMIN — INSULIN HUMAN SCH: 100 INJECTION, SOLUTION PARENTERAL at 07:39

## 2021-04-07 RX ADMIN — INSULIN HUMAN SCH: 100 INJECTION, SOLUTION PARENTERAL at 11:53

## 2021-04-07 NOTE — DISCHARGE SUMMARY
Providers





- Providers


Date of Admission: 


04/06/21 11:30





Date of discharge: 04/07/21


Attending physician: 


STAR CRANDALL





Primary care physician: 


MILA BARBOSA








Hospitalization


Condition: Good


Procedures: 





RALTH, (B) salpingectomy


Hospital course: 


This is a 50-year-old female status post robotic assisted laparoscopic total 

hysterectomy with bilateral salpingectomy.  Postoperative course has been 

unremarkable.  She is discharged home at this time.





Disposition: DC-01 TO HOME OR SELFCARE


Final Discharge Diagnosis (Prints w/discharge instructions): s/p hysterectomy 

with bilateral salpingectomy





- Discharge Diagnoses


(1) History of robot-assisted laparoscopic hysterectomy


Status: Acute   





(2) Menorrhagia


Status: Resolved   





(3) Fibroids


Status: Resolved   





(4) Pelvic pain


Status: Resolved   





(5) Hypertension


Status: Chronic   





(6) Hyperlipidemia


Status: Chronic   





(7) Vitamin D deficiency


Status: Chronic   





(8) Diverticulosis


Status: Chronic   





(9) Diabetes


Status: Chronic   





(10) Lumbar herniated disc


Status: Chronic   





Core Measure Documentation





- Palliative Care


Palliative Care/ Comfort Measures: Not Applicable





- Core Measures


Any of the following diagnoses?: none





Exam





- Constitutional


Vitals: 


                                        











Temp Pulse Resp BP Pulse Ox


 


 98.0 F   62   12   115/60   100 


 


 04/07/21 07:30  04/07/21 09:05  04/07/21 07:30  04/07/21 09:05  04/07/21 07:30











General appearance: Present: no acute distress





- Neck


Neck: Present: supple





- Respiratory


Respiratory effort: normal


Respiratory: bilateral: CTA





- Cardiovascular


Rhythm: regular





- Extremities


Extremities: no ischemia, No edema





- Abdominal


General gastrointestinal: Present: soft, non-tender, non-distended, normal bowel

sounds


Female genitourinary: Present: deferred





- Integumentary


Integumentary: Present: clear, warm, dry (Incisions:C/D/I)





- Psychiatric


Psychiatric: appropriate mood/affect, intact judgment & insight, memory intact, 

cooperative





Plan


Activity: other (No sex and no driving.  Ambulate approximately [place] her 

property daily.  Void frequently to avoid pressure on the vaginal cuff.  Use 

your incentive spirometer every hour while awake.)


Weight Bearing Status: Full Weight Bearing


Diet: low fat, low cholesterol, low salt, diabetic (Eat small meals frequently. 

Drink approximately 90 ounces of water a day.)


Wound: open to air, keep clean and dry


Special Instructions: no heavy lifting (Greater than 15 pounds)


Follow up with: 


MILA BARBOSA MD [Primary Care Provider] - 7 Days


STAR CRANDALL MD [Staff Physician] -  (As scheduled)


Prescriptions: 


Ferrous Sulfate [Ferrous Sulfate 324 MG] 324 mg PO DAILY #30 tablet.


Ibuprofen [Motrin 800 MG tab] 800 mg PO Q8H PRN #30 tablet


 PRN Reason: Pain, Mild (1-3)


HYDROcodone/APAP 5-325 [Norco 5-325 mg TAB] 1 each PO Q6H PRN #10 tablet


 PRN Reason: Pain, Moderate (4-6)